# Patient Record
Sex: FEMALE | Race: BLACK OR AFRICAN AMERICAN
[De-identification: names, ages, dates, MRNs, and addresses within clinical notes are randomized per-mention and may not be internally consistent; named-entity substitution may affect disease eponyms.]

---

## 2018-04-26 ENCOUNTER — HOSPITAL ENCOUNTER (EMERGENCY)
Dept: HOSPITAL 68 - ERH | Age: 58
End: 2018-04-26
Payer: COMMERCIAL

## 2018-04-26 VITALS — DIASTOLIC BLOOD PRESSURE: 92 MMHG | SYSTOLIC BLOOD PRESSURE: 166 MMHG

## 2018-04-26 VITALS — WEIGHT: 154 LBS | HEIGHT: 64.5 IN | BODY MASS INDEX: 25.97 KG/M2

## 2018-04-26 DIAGNOSIS — B02.9: Primary | ICD-10-CM

## 2018-04-26 NOTE — ED SKIN/ALLERGY COMPLAINT
History of Present Illness
 
General
Chief Complaint: Skin Rash/ Abcess
Stated Complaint: SKIN RASH
Source: patient
Exam Limitations: no limitations
 
Vital Signs & Intake/Output
Vital Signs & Intake/Output
 Vital Signs
 
 
Date Time Temp Pulse Resp B/P B/P Pulse O2 O2 Flow FiO2
 
     Mean Ox Delivery Rate 
 
04/26 0912 97.9 92 18 166/92  98 Room Air Room Air 
 
 
 
Allergies
Coded Allergies:
acetaminophen (Intermediate, BLEEDING-GI 06/18/16)
 
Reconcile Medications
Acyclovir 800 MG TABLET   1 TAB PO 5 TIMES A DAY SHINGLES
Amoxicillin/Potassium Clav (Augmentin 875-125 Tablet) 1 EACH TABLET   1 TAB PO 
BID cellulitis
Esomeprazole (Nexium) 40 MG CAPSULE.DR   1 CAP PO BID GI  (Reported)
Losartan/Hydrochlorothiazide (Losartan-Hctz 100-25 MG Tab) 1 EACH TABLET   1 TAB
PO DAILY BP  (Reported)
Multivitamin (Multi-Day Vitamins) 1 EACH TABLET   1 TAB PO DAILY SUPPLEMENT  (
Reported)
 
Triage Note:
PT TO ED WITH C/O RASH TO LEFT NECK AREA, "I
 NOTICED IT YESTERDAY, MAYBE A SPIDER BITE ?"
Triage Nurses Notes Reviewed? yes
Onset: Gradual
Duration: day(s): (2)
Timing: remote history
Severity: moderate
Location: LEFT SHOULDER
Possible Factors: ?SPIDER
No Modifying Factors: none
Associated Symptoms: rash
HPI:
Patient is a 58-year-old female presenting to the emergency Department chief 
complaint of rash to left shoulder spreading to the left-sided neck that started
2-3 days ago.  Rash is not painful.  No fevers or chills nausea vomiting chest 
pain or shortness of breath.  History of similar symptoms about a year or 2 ago.
 She reports that the rashes vesicular in nature.,  Denies any active discharge.
 She has not been taking anything else for symptoms.
 
Past History
 
Travel History
Traveled to [place] past 21 day No
 
Medical History
Any Pertinent Medical History? see below for history
Neurological: NONE
EENT: NONE
Cardiovascular: hyperlipidemia
Respiratory: NONE
Gastrointestinal: GERD
Hepatic: NONE
Renal: NONE
Musculoskeletal: NONE
Psychiatric: NONE
Endocrine: NONE
Blood Disorders: NONE
Cancer(s): NONE
GYN/Reproductive: NONE
History of MRSA: No
History of VRE: No
History of CDIFF: No
 
Surgical History
Surgical History: non-contributory
 
Psychosocial History
Who do you live with Spouse
What is your primary language English
Tobacco Use: Quit >30 days ago
ETOH Use: denies use
Illicit Drug Use: denies illicit drug use
 
Family History
Family History, If Any:
MOTHER (Colorectal cancer).
 
Hx Contributory? No
 
Review of Systems
 
Review of Systems
Constitutional:
Reports: no symptoms. 
Comments
Review of systems: See HPI, All other systems negative.
Constitutional, no chills fever or weight loss
HEENT: No visual changes no sore throat no congestion
Cardiovascular: No chest pain ,palpitation , orthopnea or ankle swelling
Skin, no jaundice 
Respiratory: No dyspnea cough sputum or hemoptysis
GI: No nausea no vomiting
: No dysuria No hematuria
Muscle skeletal: no back pain, no neck pain,
Neurologic: No numbness no confusion NO HEADACHES
Psych: No stress anxiety or depression,.
Heme/endocrine: No bruising no bleeding no polyuria or polydipsia
Immunology: No splenectomy or history of AIDS
 
Physical Exam
 
Physical Exam
General Appearance: well developed/nourished, no apparent distress, alert, awake
, comfortable
Comments:
Well-developed well-nourished person in no acute distress
HEENT: Atraumatic, normocephalic
Neck: Normal inspection
Back: Nontender
Respiratory:  No respiratory distress.
Extremity: No edema
Neuro: Alert oriented x3
Skin: Vesicular erythematous rash approximately 4-6 cm over the left shoulder 
with no surrounding erythema.  Appears to be a dewdrop on a екатерина petal.  No 
active drainage.  No excoriations noted.  No other lesions noted on exposed 
skin.
Psych: Mood and affect is normal, memory and judgment is normal.
 
Progress
Differential Diagnosis: CELLULITIS, SHINGLES, ABSCESS, CONTACT DERMATITIS, 
IRRITANT DERMATITIS
Plan of Care:
Patient started on acyclovir for shingles.  She'll follow up with PCP.  Educated
on signs and symptoms return.  Discussed with Dr. Gonzalez and he agrees with plan.
 
Departure
 
Departure
Time of Disposition: 1014
Disposition: HOME OR SELF CARE
Condition: Stable
Clinical Impression
Primary Impression: Shingles
Qualifiers:  Herpes zoster complications: without complications Qualified Code: 
B02.9 - Zoster without complications
Referrals:
Mohan Martin MD (PCP/Family)
 
Additional Instructions:
Follow-up with your primary care physician calling appointment.  Take antivirals
as prescribed.  Take over-the-counter Motrin as well as directed for any aches 
or pains.  Return for worsening symptoms or concerns.
Departure Forms:
Customer Survey
General Discharge Information
Prescriptions:
Current Visit Scripts
Acyclovir 1 TAB PO 5 TIMES A DAY  
     #35 TAB